# Patient Record
Sex: FEMALE | Race: WHITE | NOT HISPANIC OR LATINO | Employment: FULL TIME | URBAN - METROPOLITAN AREA
[De-identification: names, ages, dates, MRNs, and addresses within clinical notes are randomized per-mention and may not be internally consistent; named-entity substitution may affect disease eponyms.]

---
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
## 2023-03-01 DIAGNOSIS — Z30.42 ENCOUNTER FOR DEPO-PROVERA CONTRACEPTION: ICD-10-CM

## 2023-03-02 ENCOUNTER — CLINICAL SUPPORT (OUTPATIENT)
Dept: FAMILY MEDICINE CLINIC | Facility: CLINIC | Age: 18
End: 2023-03-02
Payer: COMMERCIAL

## 2023-03-02 DIAGNOSIS — Z30.8 ENCOUNTER FOR OTHER CONTRACEPTIVE MANAGEMENT: ICD-10-CM

## 2023-03-02 DIAGNOSIS — Z30.42 ENCOUNTER FOR DEPO-PROVERA CONTRACEPTION: Primary | ICD-10-CM

## 2023-03-02 PROCEDURE — 96372 THER/PROPH/DIAG INJ SC/IM: CPT | Performed by: PHYSICIAN ASSISTANT

## 2023-03-02 RX ORDER — MEDROXYPROGESTERONE ACETATE 150 MG/ML
150 INJECTION, SUSPENSION INTRAMUSCULAR ONCE
Status: COMPLETED | OUTPATIENT
Start: 2023-03-02 | End: 2023-03-02

## 2023-03-02 RX ORDER — MEDROXYPROGESTERONE ACETATE 150 MG/ML
INJECTION, SUSPENSION INTRAMUSCULAR
Qty: 1 ML | Refills: 0 | Status: SHIPPED | OUTPATIENT
Start: 2023-03-02

## 2023-03-02 RX ADMIN — MEDROXYPROGESTERONE ACETATE 150 MG: 150 INJECTION, SUSPENSION INTRAMUSCULAR at 16:22

## 2023-05-22 ENCOUNTER — TELEPHONE (OUTPATIENT)
Dept: FAMILY MEDICINE CLINIC | Facility: CLINIC | Age: 18
End: 2023-05-22

## 2023-05-22 NOTE — TELEPHONE ENCOUNTER
PT CALLED AND WOULD LIKE TO KNOW IF RX   medroxyPROGESTERone Acetate 150 MG/ML suspension prefilled syringe      HAS ALREADY BEEN SENT PHARMACY FOR HER TO  TO TOMORROW, PT STATED THAT SHE IS DUE FOR SHOT TOMORROW.    PLEASE ADVISE.cALL BACK:[phone]

## 2023-05-23 DIAGNOSIS — Z30.42 ENCOUNTER FOR DEPO-PROVERA CONTRACEPTION: ICD-10-CM

## 2023-05-23 RX ORDER — MEDROXYPROGESTERONE ACETATE 150 MG/ML
1 INJECTION, SUSPENSION INTRAMUSCULAR
Qty: 1 ML | Refills: 3 | Status: SHIPPED | OUTPATIENT
Start: 2023-05-23

## 2023-05-23 NOTE — TELEPHONE ENCOUNTER
Refill sent to pharmacy. I have not seen this patient since 2021. Needs to be seen by our office once per year for any ongoing prescriptions. No updated insurance on file.  Please have her schedule a follow up visit for annual

## 2023-05-30 ENCOUNTER — OFFICE VISIT (OUTPATIENT)
Dept: FAMILY MEDICINE CLINIC | Facility: CLINIC | Age: 18
End: 2023-05-30
Payer: COMMERCIAL

## 2023-05-30 VITALS
RESPIRATION RATE: 18 BRPM | DIASTOLIC BLOOD PRESSURE: 72 MMHG | TEMPERATURE: 98.2 F | HEART RATE: 80 BPM | SYSTOLIC BLOOD PRESSURE: 124 MMHG | HEIGHT: 66 IN | WEIGHT: 242.2 LBS | OXYGEN SATURATION: 100 % | BODY MASS INDEX: 38.92 KG/M2

## 2023-05-30 DIAGNOSIS — Z13.1 SCREENING FOR DIABETES MELLITUS: ICD-10-CM

## 2023-05-30 DIAGNOSIS — R63.2 BINGE EATING: ICD-10-CM

## 2023-05-30 DIAGNOSIS — Z00.00 ENCOUNTER FOR WELL ADULT EXAM WITHOUT ABNORMAL FINDINGS: Primary | ICD-10-CM

## 2023-05-30 DIAGNOSIS — Z13.220 ENCOUNTER FOR LIPID SCREENING FOR CARDIOVASCULAR DISEASE: ICD-10-CM

## 2023-05-30 DIAGNOSIS — Z11.59 NEED FOR HEPATITIS C SCREENING TEST: ICD-10-CM

## 2023-05-30 DIAGNOSIS — Z30.42 ENCOUNTER FOR SURVEILLANCE OF INJECTABLE CONTRACEPTIVE: ICD-10-CM

## 2023-05-30 DIAGNOSIS — R53.82 CHRONIC FATIGUE: ICD-10-CM

## 2023-05-30 DIAGNOSIS — E66.9 OBESITY (BMI 30-39.9): ICD-10-CM

## 2023-05-30 DIAGNOSIS — Z13.6 ENCOUNTER FOR LIPID SCREENING FOR CARDIOVASCULAR DISEASE: ICD-10-CM

## 2023-05-30 DIAGNOSIS — E55.9 VITAMIN D DEFICIENCY: ICD-10-CM

## 2023-05-30 RX ORDER — MEDROXYPROGESTERONE ACETATE 150 MG/ML
150 INJECTION, SUSPENSION INTRAMUSCULAR
COMMUNITY

## 2023-05-30 RX ORDER — BUPROPION HYDROCHLORIDE 150 MG/1
150 TABLET ORAL DAILY
Qty: 30 TABLET | Refills: 1 | Status: SHIPPED | OUTPATIENT
Start: 2023-05-30

## 2023-05-30 RX ORDER — MEDROXYPROGESTERONE ACETATE 150 MG/ML
150 INJECTION, SUSPENSION INTRAMUSCULAR ONCE
Status: COMPLETED | OUTPATIENT
Start: 2023-05-30 | End: 2023-05-30

## 2023-05-30 RX ADMIN — MEDROXYPROGESTERONE ACETATE 150 MG: 150 INJECTION, SUSPENSION INTRAMUSCULAR at 17:04

## 2023-05-30 NOTE — PROGRESS NOTES
"Chief Complaint   Patient presents with    Annual Exam     Physical-depo shot       Subjective   The patient is a 17 y.o. female who presents for annual exam      Nutrition: processed food at work. Been trying to eat more fruit and grilled meats.   Exercise:  walking a lot at work. Riding bike.   Sleep: sleep doing okay. Snores rarely. Does stay fatigue. Drinks Monster energy drink in morning (zero sugar)      Grinds teeth. Recently lost filling. Due to follow up with dentist soon  Has astigmatism in R eye. Has glasses. Don't wear all the time. Notes they give her headaches.       Past Gynecological History:     No LMP recorded. unknown due to depo shot      Menses: rare     Contraception: Medroxyprogesterone ( Depo Provera) 150 mg IM x 1 then q 12 weeks     Pap History:she has never had a PAP test     Date of last mammogram: she has never had a mammogram. No direct relatives with breast cancer   Cousin at age of 24 had ovarian cancer. Had hysterectomy. On maternal side.      Past Medical History,Medications, Allergies reviewed.     HPI  Concerned about weight   Body mass index is 39.09 kg/m².   Stays active with job   Harder to lose weight with depo shot. Denies gaining weight    Was on WW in the past in [employer]. Last about 20 lbs.  Tried again but not as successful    Does think she struggles with binge eating at times    Pt wishes to continue depo at this time     Has been on Wellbutrin in the past for depression. Unsure how she did on this. Notes she was only on a low dose.    Zoloft before for depression but no longer taking       Objective     /72   Pulse 80   Temp 98.2 °F (36.8 °C)   Resp 18   Ht 167.6 cm (66\")   Wt 110 kg (242 lb 3.2 oz)   SpO2 100%   BMI 39.09 kg/m²     Physical Exam  Vitals and nursing note reviewed.   Constitutional:       Appearance: She is well-developed.   HENT:      Head: Normocephalic and atraumatic.      Right Ear: External ear normal.      Left Ear: External ear " normal.      Nose: Nose normal.      Mouth/Throat:      Pharynx: No oropharyngeal exudate.   Eyes:      Conjunctiva/sclera: Conjunctivae normal.   Neck:      Thyroid: No thyromegaly.      Trachea: No tracheal deviation.   Cardiovascular:      Rate and Rhythm: Normal rate and regular rhythm.      Heart sounds: Normal heart sounds.   Pulmonary:      Effort: Pulmonary effort is normal. No respiratory distress.      Breath sounds: Normal breath sounds. No wheezing or rales.   Chest:      Chest wall: No tenderness.   Abdominal:      General: Bowel sounds are normal.      Palpations: Abdomen is soft.      Tenderness: There is no abdominal tenderness.   Musculoskeletal:      Cervical back: Normal range of motion and neck supple.   Lymphadenopathy:      Cervical: No cervical adenopathy.   Skin:     General: Skin is warm and dry.   Neurological:      Mental Status: She is alert and oriented to person, place, and time.   Psychiatric:         Mood and Affect: Mood normal.         Behavior: Behavior normal.         Thought Content: Thought content normal.         Judgment: Judgment normal.       Assessment & Plan     1. Encounter for well adult exam without abnormal findings  - CBC w AUTO Differential  - Comprehensive metabolic panel  - TSH  - T4, free  - Hemoglobin A1c  - Iron Profile  - Vitamin B12  - Folate  - Lipid Panel  - Vitamin D 25 hydroxy  - Hepatitis C antibody  - POCT pregnancy, urine    2. Obesity (BMI 30-39.9)  - CBC w AUTO Differential  - Comprehensive metabolic panel  - TSH  - T4, free  - Hemoglobin A1c  - Iron Profile  - Vitamin B12  - Folate  - Lipid Panel  - buPROPion XL (Wellbutrin XL) 150 MG 24 hr tablet; Take 1 tablet by mouth Daily.  Dispense: 30 tablet; Refill: 1    3. Encounter for lipid screening for cardiovascular disease  - Lipid Panel    4. Screening for diabetes mellitus  - Hemoglobin A1c    5. Chronic fatigue  - CBC w AUTO Differential  - Comprehensive metabolic panel  - TSH  - T4, free  - Iron  Profile  - Vitamin B12  - Folate    6. Vitamin D deficiency  - Vitamin D 25 hydroxy    7. Binge eating  - buPROPion XL (Wellbutrin XL) 150 MG 24 hr tablet; Take 1 tablet by mouth Daily.  Dispense: 30 tablet; Refill: 1    8. Need for hepatitis C screening test  - Hepatitis C antibody    9. Encounter for surveillance of injectable contraceptive  - POCT pregnancy, urine  - MedroxyPROGESTERone Acetate (DEPO-PROVERA) injection 150 mg    Labs as outlined in plan   UPT negative. Depo administered with patient's consent   Trial of wellbutrin to help with mood and binge eating.     Counseling was given to patient for the following topics: instructions for management, risk factor reductions, patient and family education, impressions, and risks and benefits of treatment options . Total time of the encounter was 20 minutes and 10 minutes was spent counseling.    ROBERT Armstrong

## 2023-06-13 LAB
25(OH)D3+25(OH)D2 SERPL-MCNC: 25.4 NG/ML (ref 30–100)
ALBUMIN SERPL-MCNC: 4.8 G/DL (ref 3.5–5.2)
ALBUMIN/GLOB SERPL: 2.1 G/DL
ALP SERPL-CCNC: 163 U/L (ref 39–117)
ALT SERPL-CCNC: 28 U/L (ref 1–33)
AST SERPL-CCNC: 19 U/L (ref 1–32)
BASOPHILS # BLD AUTO: 0.03 10*3/MM3 (ref 0–0.2)
BASOPHILS NFR BLD AUTO: 0.4 % (ref 0–1.5)
BILIRUB SERPL-MCNC: 0.8 MG/DL (ref 0–1.2)
BUN SERPL-MCNC: 12 MG/DL (ref 6–20)
BUN/CREAT SERPL: 18.5 (ref 7–25)
CALCIUM SERPL-MCNC: 10 MG/DL (ref 8.6–10.5)
CHLORIDE SERPL-SCNC: 105 MMOL/L (ref 98–107)
CHOLEST SERPL-MCNC: 148 MG/DL (ref 0–200)
CO2 SERPL-SCNC: 23.6 MMOL/L (ref 22–29)
CREAT SERPL-MCNC: 0.65 MG/DL (ref 0.57–1)
EGFRCR SERPLBLD CKD-EPI 2021: 130.3 ML/MIN/1.73
EOSINOPHIL # BLD AUTO: 0.13 10*3/MM3 (ref 0–0.4)
EOSINOPHIL NFR BLD AUTO: 1.9 % (ref 0.3–6.2)
ERYTHROCYTE [DISTWIDTH] IN BLOOD BY AUTOMATED COUNT: 12.7 % (ref 12.3–15.4)
FOLATE SERPL-MCNC: 6.94 NG/ML (ref 4.78–24.2)
GLOBULIN SER CALC-MCNC: 2.3 GM/DL
GLUCOSE SERPL-MCNC: 83 MG/DL (ref 65–99)
HBA1C MFR BLD: 5.1 % (ref 4.8–5.6)
HCT VFR BLD AUTO: 40.4 % (ref 34–46.6)
HCV IGG SERPL QL IA: NON REACTIVE
HDLC SERPL-MCNC: 42 MG/DL (ref 40–60)
HGB BLD-MCNC: 13.7 G/DL (ref 12–15.9)
IMM GRANULOCYTES # BLD AUTO: 0.02 10*3/MM3 (ref 0–0.05)
IMM GRANULOCYTES NFR BLD AUTO: 0.3 % (ref 0–0.5)
IRON SATN MFR SERPL: 44 % (ref 20–50)
IRON SERPL-MCNC: 217 MCG/DL (ref 37–145)
LDLC SERPL CALC-MCNC: 89 MG/DL (ref 0–100)
LYMPHOCYTES # BLD AUTO: 3.11 10*3/MM3 (ref 0.7–3.1)
LYMPHOCYTES NFR BLD AUTO: 45.9 % (ref 19.6–45.3)
MCH RBC QN AUTO: 30.4 PG (ref 26.6–33)
MCHC RBC AUTO-ENTMCNC: 33.9 G/DL (ref 31.5–35.7)
MCV RBC AUTO: 89.6 FL (ref 79–97)
MONOCYTES # BLD AUTO: 0.39 10*3/MM3 (ref 0.1–0.9)
MONOCYTES NFR BLD AUTO: 5.8 % (ref 5–12)
NEUTROPHILS # BLD AUTO: 3.09 10*3/MM3 (ref 1.7–7)
NEUTROPHILS NFR BLD AUTO: 45.7 % (ref 42.7–76)
NRBC BLD AUTO-RTO: 0.1 /100 WBC (ref 0–0.2)
PLATELET # BLD AUTO: 272 10*3/MM3 (ref 140–450)
POTASSIUM SERPL-SCNC: 4.7 MMOL/L (ref 3.5–5.2)
PROT SERPL-MCNC: 7.1 G/DL (ref 6–8.5)
RBC # BLD AUTO: 4.51 10*6/MM3 (ref 3.77–5.28)
SODIUM SERPL-SCNC: 139 MMOL/L (ref 136–145)
T4 FREE SERPL-MCNC: 1.18 NG/DL (ref 0.93–1.7)
TIBC SERPL-MCNC: 495 MCG/DL
TRIGL SERPL-MCNC: 88 MG/DL (ref 0–150)
TSH SERPL DL<=0.005 MIU/L-ACNC: 3.79 UIU/ML (ref 0.27–4.2)
UIBC SERPL-MCNC: 278 MCG/DL (ref 112–346)
VIT B12 SERPL-MCNC: 740 PG/ML (ref 211–946)
VLDLC SERPL CALC-MCNC: 17 MG/DL (ref 5–40)
WBC # BLD AUTO: 6.77 10*3/MM3 (ref 3.4–10.8)

## 2023-06-19 DIAGNOSIS — R79.0 ABNORMAL BLOOD LEVEL OF IRON: Primary | ICD-10-CM

## 2023-08-21 ENCOUNTER — TELEPHONE (OUTPATIENT)
Dept: FAMILY MEDICINE CLINIC | Facility: CLINIC | Age: 18
End: 2023-08-21
Payer: COMMERCIAL

## 2023-08-21 ENCOUNTER — CLINICAL SUPPORT (OUTPATIENT)
Dept: FAMILY MEDICINE CLINIC | Facility: CLINIC | Age: 18
End: 2023-08-21
Payer: COMMERCIAL

## 2023-08-21 DIAGNOSIS — E66.9 OBESITY (BMI 30-39.9): ICD-10-CM

## 2023-08-21 DIAGNOSIS — R63.2 BINGE EATING: ICD-10-CM

## 2023-08-21 DIAGNOSIS — Z30.9 ENCOUNTER FOR CONTRACEPTIVE MANAGEMENT, UNSPECIFIED TYPE: Primary | ICD-10-CM

## 2023-08-21 PROCEDURE — 96372 THER/PROPH/DIAG INJ SC/IM: CPT | Performed by: PHYSICIAN ASSISTANT

## 2023-08-21 RX ORDER — MEDROXYPROGESTERONE ACETATE 150 MG/ML
150 INJECTION, SUSPENSION INTRAMUSCULAR
Status: CANCELLED | OUTPATIENT
Start: 2023-08-21

## 2023-08-21 RX ORDER — BUPROPION HYDROCHLORIDE 150 MG/1
150 TABLET ORAL DAILY
Qty: 30 TABLET | Refills: 1 | Status: SHIPPED | OUTPATIENT
Start: 2023-08-21

## 2023-08-21 RX ORDER — MEDROXYPROGESTERONE ACETATE 150 MG/ML
150 INJECTION, SUSPENSION INTRAMUSCULAR ONCE
Status: COMPLETED | OUTPATIENT
Start: 2023-08-21 | End: 2023-08-21

## 2023-08-21 RX ADMIN — MEDROXYPROGESTERONE ACETATE 150 MG: 150 INJECTION, SUSPENSION INTRAMUSCULAR at 15:35

## 2023-08-21 NOTE — TELEPHONE ENCOUNTER
Caller: Zaynab Starks    Relationship: Self    Best call back number: [phone]     Requested Prescriptions:   Requested Prescriptions     Pending Prescriptions Disp Refills    buPROPion XL (Wellbutrin XL) 150 MG 24 hr tablet 30 tablet 1     Sig: Take 1 tablet by mouth Daily.        Pharmacy where request should be sent: [facility] [address] [place] [address] [phone] [facility] [phone] FX     Last office visit with prescribing clinician: 5/30/2023   Last telemedicine visit with prescribing clinician: Visit date not found   Next office visit with prescribing clinician: Visit date not found     Does the patient have less than a 3 day supply:  [x] Yes  [] No    Would you like a call back once the refill request has been completed: [] Yes [x] No    If the office needs to give you a call back, can they leave a voicemail: [] Yes [x] No    Dedrick Mckeon Rep   08/21/23 10:21 EDT

## 2023-08-21 NOTE — TELEPHONE ENCOUNTER
Pt called stating she needed to get her depo shot today because she can only get it done on Mondays and next Monday would be two day past.      [phone]

## 2023-09-15 ENCOUNTER — TELEPHONE (OUTPATIENT)
Dept: FAMILY MEDICINE CLINIC | Facility: CLINIC | Age: 18
End: 2023-09-15
Payer: COMMERCIAL

## 2023-11-13 ENCOUNTER — CLINICAL SUPPORT (OUTPATIENT)
Dept: FAMILY MEDICINE CLINIC | Facility: CLINIC | Age: 18
End: 2023-11-13
Payer: COMMERCIAL

## 2023-11-13 DIAGNOSIS — Z30.42 ENCOUNTER FOR DEPO-PROVERA CONTRACEPTION: Primary | ICD-10-CM

## 2023-11-13 RX ORDER — MEDROXYPROGESTERONE ACETATE 150 MG/ML
150 INJECTION, SUSPENSION INTRAMUSCULAR ONCE
Status: COMPLETED | OUTPATIENT
Start: 2023-11-13 | End: 2023-11-13

## 2023-11-13 RX ADMIN — MEDROXYPROGESTERONE ACETATE 150 MG: 150 INJECTION, SUSPENSION INTRAMUSCULAR at 14:31

## 2024-02-12 ENCOUNTER — CLINICAL SUPPORT (OUTPATIENT)
Dept: FAMILY MEDICINE CLINIC | Facility: CLINIC | Age: 19
End: 2024-02-12
Payer: COMMERCIAL

## 2024-02-12 DIAGNOSIS — Z30.42 ENCOUNTER FOR DEPO-PROVERA CONTRACEPTION: Primary | ICD-10-CM

## 2024-02-12 PROCEDURE — 96372 THER/PROPH/DIAG INJ SC/IM: CPT | Performed by: PHYSICIAN ASSISTANT

## 2024-02-12 RX ORDER — MEDROXYPROGESTERONE ACETATE 150 MG/ML
150 INJECTION, SUSPENSION INTRAMUSCULAR ONCE
Status: COMPLETED | OUTPATIENT
Start: 2024-02-12 | End: 2024-02-12

## 2024-02-12 RX ADMIN — MEDROXYPROGESTERONE ACETATE 150 MG: 150 INJECTION, SUSPENSION INTRAMUSCULAR at 11:45

## 2024-05-13 ENCOUNTER — TELEPHONE (OUTPATIENT)
Dept: FAMILY MEDICINE CLINIC | Facility: CLINIC | Age: 19
End: 2024-05-13
Payer: COMMERCIAL

## 2024-05-13 ENCOUNTER — CLINICAL SUPPORT (OUTPATIENT)
Dept: FAMILY MEDICINE CLINIC | Facility: CLINIC | Age: 19
End: 2024-05-13
Payer: COMMERCIAL

## 2024-05-13 DIAGNOSIS — Z30.42 ENCOUNTER FOR DEPO-PROVERA CONTRACEPTION: Primary | ICD-10-CM

## 2024-05-13 PROCEDURE — 96372 THER/PROPH/DIAG INJ SC/IM: CPT | Performed by: PHYSICIAN ASSISTANT

## 2024-05-13 RX ORDER — MEDROXYPROGESTERONE ACETATE 150 MG/ML
INJECTION, SUSPENSION INTRAMUSCULAR
Qty: 1 ML | Refills: 0 | OUTPATIENT
Start: 2024-05-13

## 2024-05-13 RX ORDER — MEDROXYPROGESTERONE ACETATE 150 MG/ML
150 INJECTION, SUSPENSION INTRAMUSCULAR
Qty: 1 ML | Refills: 3 | Status: SHIPPED | OUTPATIENT
Start: 2024-05-13

## 2024-05-13 RX ORDER — MEDROXYPROGESTERONE ACETATE 150 MG/ML
150 INJECTION, SUSPENSION INTRAMUSCULAR ONCE
Status: COMPLETED | OUTPATIENT
Start: 2024-05-13 | End: 2024-05-13

## 2024-05-13 RX ADMIN — MEDROXYPROGESTERONE ACETATE 150 MG: 150 INJECTION, SUSPENSION INTRAMUSCULAR at 17:42

## 2024-05-13 NOTE — TELEPHONE ENCOUNTER
REFILL WAS DENIED DUE TO NOT PAP SMEAR ON FILE. PATIENT CALLED STATING THEY ARE NOT ELIGIBLE FOR PAP SMEAR DUE TO BEING UNDER 19 YEARS OLD. PLEASE ADVISE

## 2024-05-13 NOTE — TELEPHONE ENCOUNTER
Refill sent to pharmacy. Make sure if late on injection there is no chance of pregnancy before next injection. Needs to schedule annual and will discuss getting her established with GYN after she turns 19

## 2024-05-13 NOTE — TELEPHONE ENCOUNTER
Caller: Zaynab Starks    Relationship: Self    Best call back number: [phone]    Requested Prescriptions:   Requested Prescriptions     Pending Prescriptions Disp Refills    medroxyPROGESTERone Acetate 150 MG/ML suspension prefilled syringe [Pharmacy Med Name: medroxyPROGESTERone Acetate 150 MG/ML Intramuscular Suspension Prefilled Syringe] 1 mL 0     Sig: INJECT 1 ML INTRAMUSCULARLY EVERY 3 MONTHS        Pharmacy where request should be sent: [address] [phone] [facility] [phone]      Last office visit with prescribing clinician: 5/30/2023   Last telemedicine visit with prescribing clinician: Visit date not found   Next office visit with prescribing clinician: Visit date not found     Additional details provided by patient: PATIENT STATES THAT IF SHE NEEDS TO BE SEEN PLEASE LET HER KNOW    Does the patient have less than a 3 day supply:  [x] Yes  [] No    Would you like a call back once the refill request has been completed: [] Yes [x] No    If the office needs to give you a call back, can they leave a voicemail: [] Yes [x] No    Dedrick Bartholomew Rep   05/13/24 10:07 EDT

## 2024-06-10 ENCOUNTER — OFFICE VISIT (OUTPATIENT)
Dept: FAMILY MEDICINE CLINIC | Facility: CLINIC | Age: 19
End: 2024-06-10
Payer: COMMERCIAL

## 2024-06-10 VITALS
WEIGHT: 254 LBS | HEIGHT: 66 IN | BODY MASS INDEX: 40.82 KG/M2 | SYSTOLIC BLOOD PRESSURE: 132 MMHG | OXYGEN SATURATION: 99 % | DIASTOLIC BLOOD PRESSURE: 72 MMHG | HEART RATE: 70 BPM | TEMPERATURE: 98.4 F

## 2024-06-10 DIAGNOSIS — Z00.00 ENCOUNTER FOR WELL ADULT EXAM WITHOUT ABNORMAL FINDINGS: Primary | ICD-10-CM

## 2024-06-10 DIAGNOSIS — R63.2 BINGE EATING: ICD-10-CM

## 2024-06-10 DIAGNOSIS — E66.01 MORBID OBESITY: ICD-10-CM

## 2024-06-10 DIAGNOSIS — Z13.6 ENCOUNTER FOR LIPID SCREENING FOR CARDIOVASCULAR DISEASE: ICD-10-CM

## 2024-06-10 DIAGNOSIS — Z13.220 ENCOUNTER FOR LIPID SCREENING FOR CARDIOVASCULAR DISEASE: ICD-10-CM

## 2024-06-10 DIAGNOSIS — E55.9 VITAMIN D DEFICIENCY: ICD-10-CM

## 2024-06-10 PROCEDURE — 99395 PREV VISIT EST AGE 18-39: CPT | Performed by: PHYSICIAN ASSISTANT

## 2024-06-10 RX ORDER — BUPROPION HYDROCHLORIDE 150 MG/1
150 TABLET ORAL DAILY
Qty: 90 TABLET | Refills: 3 | Status: SHIPPED | OUTPATIENT
Start: 2024-06-10

## 2024-06-10 NOTE — PROGRESS NOTES
"Chief Complaint   Patient presents with    Annual Exam     Med refill on Wellbutrin       Subjective   The patient is a 18 y.o. female who presents for annual physical      Nutrition:  increasing protein and vegetables.  cutting back on energy drinks. 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 Does wear prescription readers when needed.       Past Gynecological History:     No LMP recorded.     Menses: irregular     Contraception: Medroxyprogesterone ( Depo Provera) 150 mg IM x 1 then q 12 weeks     Sexually transmitted disease history: none     Pap History:she has never had a PAP test       Past Medical History,Medications, Allergies reviewed.     HPI  Pt presents for annual exam   Continues with depo provera. Okay on refills. Will place referral to GYN to establish care and for first pap after 19st birthday later this fall     Notes she took Wellbutrin for awhile for BED and felt like it helped. Would like to resume. Needs refills.       Objective     /72   Pulse 70   Temp 98.4 °F (36.9 °C)   Ht 167.6 cm (66\")   Wt 115 kg (254 lb)   SpO2 99%   BMI 41.00 kg/m²     Physical Exam  Vitals and nursing note reviewed.   Constitutional:       Appearance: Normal appearance. She is well-developed.   HENT:      Head: Normocephalic and atraumatic.      Right Ear: Tympanic membrane, ear canal and external ear normal.      Left Ear: Tympanic membrane, ear canal and external ear normal.      Nose: Nose normal.      Mouth/Throat:      Pharynx: No oropharyngeal exudate or posterior oropharyngeal erythema.   Eyes:      Conjunctiva/sclera: Conjunctivae normal.   Neck:      Thyroid: No thyromegaly.      Trachea: No tracheal deviation.   Cardiovascular:      Rate and Rhythm: Normal rate and regular rhythm.      Heart sounds: Normal heart sounds.   Pulmonary:      Effort: Pulmonary effort is normal. No respiratory " distress.      Breath sounds: Normal breath sounds. No wheezing or rales.   Chest:      Chest wall: No tenderness.   Abdominal:      General: Bowel sounds are normal. There is no distension.      Palpations: Abdomen is soft. There is no mass.      Tenderness: There is no abdominal tenderness. There is no guarding or rebound.      Hernia: No hernia is present.   Musculoskeletal:      Cervical back: Normal range of motion and neck supple.   Lymphadenopathy:      Cervical: No cervical adenopathy.   Skin:     General: Skin is warm and dry.   Neurological:      Mental Status: She is alert and oriented to person, place, and time.   Psychiatric:         Mood and Affect: Mood normal.         Behavior: Behavior normal.         Thought Content: Thought content normal.         Judgment: Judgment normal.         Assessment & Plan     1. Encounter for well adult exam without abnormal findings  - Ambulatory Referral to Gynecology  - CBC w AUTO Differential  - Comprehensive metabolic panel  - Lipid Panel  - Hemoglobin A1c  - TSH  - T4, free  - Iron Profile  - Vitamin B12  - Folate  - Vitamin D 25 hydroxy    2. Encounter for lipid screening for cardiovascular disease  - Lipid Panel    3. Vitamin D deficiency  - Vitamin D 25 hydroxy    4. Morbid obesity  - CBC w AUTO Differential  - Comprehensive metabolic panel  - Lipid Panel  - Hemoglobin A1c  - TSH  - T4, free  - Iron Profile  - Vitamin B12  - Folate  - Vitamin D 25 hydroxy    5. Binge eating  - buPROPion XL (Wellbutrin XL) 150 MG 24 hr tablet; Take 1 tablet by mouth Daily.  Dispense: 90 tablet; Refill: 3    Labs as outlined in plan   Referral to GYN placed   Continue with wellbutrin   F/u in 3 months to check on BED symptoms and weight management     Counseling was given to patient for the following topics: instructions for management, risk factor reductions, patient and family education, and impressions . Total time of the encounter was 10 minutes and 5 minutes was spent  counseling.      ROBERT Armstrong

## 2024-06-11 LAB
25(OH)D3+25(OH)D2 SERPL-MCNC: 22.7 NG/ML (ref 30–100)
ALBUMIN SERPL-MCNC: 4.7 G/DL (ref 4–5)
ALBUMIN/GLOB SERPL: 2.2 {RATIO}
ALP SERPL-CCNC: 101 IU/L (ref 42–106)
ALT SERPL-CCNC: 20 IU/L (ref 0–32)
AST SERPL-CCNC: 18 IU/L (ref 0–40)
BASOPHILS # BLD AUTO: 0 X10E3/UL (ref 0–0.2)
BASOPHILS NFR BLD AUTO: 0 %
BILIRUB SERPL-MCNC: 0.8 MG/DL (ref 0–1.2)
BUN SERPL-MCNC: 11 MG/DL (ref 6–20)
BUN/CREAT SERPL: 16 (ref 9–23)
CALCIUM SERPL-MCNC: 9.2 MG/DL (ref 8.7–10.2)
CHLORIDE SERPL-SCNC: 107 MMOL/L (ref 96–106)
CHOLEST SERPL-MCNC: 144 MG/DL (ref 100–199)
CO2 SERPL-SCNC: 19 MMOL/L (ref 20–29)
CREAT SERPL-MCNC: 0.69 MG/DL (ref 0.57–1)
EGFRCR SERPLBLD CKD-EPI 2021: 127 ML/MIN/1.73
EOSINOPHIL # BLD AUTO: 0.1 X10E3/UL (ref 0–0.4)
EOSINOPHIL NFR BLD AUTO: 1 %
ERYTHROCYTE [DISTWIDTH] IN BLOOD BY AUTOMATED COUNT: 11.7 % (ref 11.7–15.4)
FOLATE SERPL-MCNC: 7.2 NG/ML
GLOBULIN SER CALC-MCNC: 2.1 G/DL (ref 1.5–4.5)
GLUCOSE SERPL-MCNC: 95 MG/DL (ref 70–99)
HBA1C MFR BLD: 5.4 % (ref 4.8–5.6)
HCT VFR BLD AUTO: 40.6 % (ref 34–46.6)
HDLC SERPL-MCNC: 40 MG/DL
HGB BLD-MCNC: 13.4 G/DL (ref 11.1–15.9)
IMM GRANULOCYTES # BLD AUTO: 0 X10E3/UL (ref 0–0.1)
IMM GRANULOCYTES NFR BLD AUTO: 0 %
IRON SATN MFR SERPL: 41 % (ref 15–55)
IRON SERPL-MCNC: 158 UG/DL (ref 27–159)
LDL CALC COMMENT:: NORMAL
LDLC SERPL CALC-MCNC: 91 MG/DL (ref 0–99)
LYMPHOCYTES # BLD AUTO: 2.6 X10E3/UL (ref 0.7–3.1)
LYMPHOCYTES NFR BLD AUTO: 37 %
MCH RBC QN AUTO: 31.4 PG (ref 26.6–33)
MCHC RBC AUTO-ENTMCNC: 33 G/DL (ref 31.5–35.7)
MCV RBC AUTO: 95 FL (ref 79–97)
MONOCYTES # BLD AUTO: 0.4 X10E3/UL (ref 0.1–0.9)
MONOCYTES NFR BLD AUTO: 6 %
NEUTROPHILS # BLD AUTO: 3.8 X10E3/UL (ref 1.4–7)
NEUTROPHILS NFR BLD AUTO: 56 %
PLATELET # BLD AUTO: 256 X10E3/UL (ref 150–450)
POTASSIUM SERPL-SCNC: 4.4 MMOL/L (ref 3.5–5.2)
PROT SERPL-MCNC: 6.8 G/DL (ref 6–8.5)
RBC # BLD AUTO: 4.27 X10E6/UL (ref 3.77–5.28)
SODIUM SERPL-SCNC: 141 MMOL/L (ref 134–144)
T4 FREE SERPL-MCNC: 1.3 NG/DL (ref 0.82–1.77)
TIBC SERPL-MCNC: 384 UG/DL (ref 250–450)
TRIGL SERPL-MCNC: 65 MG/DL (ref 0–149)
TSH SERPL DL<=0.005 MIU/L-ACNC: 3.47 UIU/ML (ref 0.45–4.5)
UIBC SERPL-MCNC: 226 UG/DL (ref 131–425)
VIT B12 SERPL-MCNC: 567 PG/ML (ref 232–1245)
VLDLC SERPL CALC-MCNC: 13 MG/DL (ref 5–40)
WBC # BLD AUTO: 6.8 X10E3/UL (ref 3.4–10.8)

## 2024-07-21 ENCOUNTER — PATIENT MESSAGE (OUTPATIENT)
Dept: FAMILY MEDICINE CLINIC | Facility: CLINIC | Age: 19
End: 2024-07-21
Payer: COMMERCIAL

## 2024-07-22 NOTE — TELEPHONE ENCOUNTER
From: Zaynab Starks  To: Xavier Sun  Sent: 7/21/2024 5:54 PM EDT  Subject: Depo Shot    Hi Xavier I have contacted the gynecologist and have an appointment but it will not be until November so will I be able to get my next depo shot dose at [facility] or will i have to contact the gynecologist office.

## 2024-07-29 ENCOUNTER — CLINICAL SUPPORT (OUTPATIENT)
Dept: FAMILY MEDICINE CLINIC | Facility: CLINIC | Age: 19
End: 2024-07-29
Payer: COMMERCIAL

## 2024-07-29 ENCOUNTER — TELEPHONE (OUTPATIENT)
Dept: FAMILY MEDICINE CLINIC | Facility: CLINIC | Age: 19
End: 2024-07-29
Payer: COMMERCIAL

## 2024-07-29 DIAGNOSIS — Z30.42 ENCOUNTER FOR DEPO-PROVERA CONTRACEPTION: Primary | ICD-10-CM

## 2024-07-29 PROCEDURE — 96372 THER/PROPH/DIAG INJ SC/IM: CPT | Performed by: PHYSICIAN ASSISTANT

## 2024-07-29 RX ORDER — MEDROXYPROGESTERONE ACETATE 150 MG/ML
150 INJECTION, SUSPENSION INTRAMUSCULAR ONCE
Status: COMPLETED | OUTPATIENT
Start: 2024-07-29 | End: 2024-07-29

## 2024-07-29 RX ADMIN — MEDROXYPROGESTERONE ACETATE 150 MG: 150 INJECTION, SUSPENSION INTRAMUSCULAR at 12:42

## 2024-07-29 NOTE — TELEPHONE ENCOUNTER
A user error has taken place: encounter opened in error, closed for administrative reasons.     breath sounds clear and equal bilaterally.

## 2024-07-29 NOTE — TELEPHONE ENCOUNTER
PATIENT HAS CALLED AND STATED PHARMACY DOES NOT HAVE HER PRESCRIPTION FOR medroxyPROGESTERone (DEPO-PROVERA) 150 MG/ML injection. PATIENT IS REQUESTING IF A NEW PRESCRIPTION CAN BE SENT TO [facility] IN [place] ON [address]. PATIENT IS REQUESTING A CALL BACK WITH STATUS OF REFILL REQUEST.     CALL BACK NUMBER [phone]

## 2024-08-15 ENCOUNTER — TELEPHONE (OUTPATIENT)
Dept: FAMILY MEDICINE CLINIC | Facility: CLINIC | Age: 19
End: 2024-08-15
Payer: COMMERCIAL

## 2024-08-15 NOTE — TELEPHONE ENCOUNTER
PATIENT RETURNED A MISS CALL, DIDN'T SEE AN ENCOUNTER ON WHO THE CALL WAS FROM. SHE'S REQUESTED THAT IF SOMEONE RECALLS TO CALL AFTER 3:30 AS SHE IS CURRENTLY AT WORK UNTIL THEN          PLEASE ADVISE

## 2024-10-21 ENCOUNTER — CLINICAL SUPPORT (OUTPATIENT)
Dept: FAMILY MEDICINE CLINIC | Facility: CLINIC | Age: 19
End: 2024-10-21
Payer: COMMERCIAL

## 2024-10-21 DIAGNOSIS — Z30.42 ENCOUNTER FOR DEPO-PROVERA CONTRACEPTION: Primary | ICD-10-CM

## 2024-10-21 PROCEDURE — 96372 THER/PROPH/DIAG INJ SC/IM: CPT | Performed by: PHYSICIAN ASSISTANT

## 2024-10-21 RX ORDER — MEDROXYPROGESTERONE ACETATE 150 MG/ML
150 INJECTION, SUSPENSION INTRAMUSCULAR ONCE
Status: COMPLETED | OUTPATIENT
Start: 2024-10-21 | End: 2024-10-21

## 2024-10-21 RX ADMIN — MEDROXYPROGESTERONE ACETATE 150 MG: 150 INJECTION, SUSPENSION INTRAMUSCULAR at 11:46

## 2024-11-04 ENCOUNTER — OFFICE VISIT (OUTPATIENT)
Dept: OBSTETRICS AND GYNECOLOGY | Facility: CLINIC | Age: 19
End: 2024-11-04
Payer: COMMERCIAL

## 2024-11-04 VITALS
DIASTOLIC BLOOD PRESSURE: 76 MMHG | WEIGHT: 254 LBS | BODY MASS INDEX: 40.82 KG/M2 | SYSTOLIC BLOOD PRESSURE: 118 MMHG | HEIGHT: 66 IN

## 2024-11-04 DIAGNOSIS — Z01.419 WOMEN'S ANNUAL ROUTINE GYNECOLOGICAL EXAMINATION: Primary | ICD-10-CM

## 2024-11-04 DIAGNOSIS — Z30.09 GENERAL COUNSELING FOR INITIATION OF OTHER CONTRACEPTIVE MEASURES: ICD-10-CM

## 2024-11-04 DIAGNOSIS — Z11.3 SCREENING EXAMINATION FOR STD (SEXUALLY TRANSMITTED DISEASE): ICD-10-CM

## 2024-11-04 PROCEDURE — 99385 PREV VISIT NEW AGE 18-39: CPT | Performed by: OBSTETRICS & GYNECOLOGY

## 2024-11-04 PROCEDURE — 99459 PELVIC EXAMINATION: CPT | Performed by: OBSTETRICS & GYNECOLOGY

## 2024-11-04 NOTE — PROGRESS NOTES
Gynecologic Annual Exam Note        CC- Here for annual exam.     Subjective     HPI  Zaynab Starks is a 19 y.o. G0 female new patient who presents for annual well woman exam. No LMP recorded (lmp unknown). Patient has had an injection. Her periods are  absent secondary to Depo injections .      Partner Status: Marital Status: single.  New Partners since last visit: YES/NO/Other: no.  Condom usage with IC: never. Has had same partner for 7 years.      She has been on Depo for approx 3-4 years. Last injection was 10/24/24.  She was told after 5 years she should switch to another form of contraception. She started on Depo for her heavy periods. She would like to discuss other options after Depo. Prior to Depo had AUB and severely heavy periods, extended period of 3 months on OCPs.    She exercises regularly: yes. Physical job.   She has concerns about domestic violence: no.    OB History          0    Para   0    Term   0       0    AB   0    Living   0         SAB   0    IAB   0    Ectopic   0    Molar   0    Multiple   0    Live Births   0                Current contraception: Depo-Provera injections  History of abnormal Pap smear:  N/A- first pap smear today   Family history of uterine, colon or ovarian cancer: yes - colon cancer- maternal grandfather , cervical cancer   Family history of breast cancer: no  H/o STDs: none   Last pap:never, first one today   Gardasil:completed    Last Pap : never  Last Completed Pap Smear       This patient has no relevant Health Maintenance data.            Health Maintenance   Topic Date Due    Annual Gynecologic Pelvic and Breast Exam  Never done    CHLAMYDIA SCREENING  Never done    PAP SMEAR  Never done    INFLUENZA VACCINE  2024    COVID-19 Vaccine ( - - season) Never done    TDAP/TD VACCINES (2 - Td or Tdap) 2024    Pneumococcal Vaccine 0-64 (1 of 2 - PCV) 06/10/2025 (Originally 2009)    ANNUAL PHYSICAL  06/10/2025    BMI FOLLOWUP  " 06/10/2025    HEPATITIS C SCREENING  Completed    MENINGOCOCCAL VACCINE  Completed    HPV VACCINES  Completed       The following portions of the patient's history were reviewed and updated as appropriate: allergies, current medications, past family history, past medical history, past social history, and past surgical history.    Review of Systems  Review of Systems   Constitutional: Negative.    HENT: Negative.     Eyes: Negative.    Respiratory: Negative.     Cardiovascular: Negative.    Gastrointestinal: Negative.    Endocrine: Negative.    Genitourinary: Negative.    Musculoskeletal: Negative.    Skin: Negative.    Allergic/Immunologic: Negative.    Neurological: Negative.    Hematological: Negative.    Psychiatric/Behavioral: Negative.         I have reviewed and agree with the HPI, ROS, and historical information as entered above. Francoise Bowden MD      Past Medical History:   Diagnosis Date    Asthma N/A    Migraine without aura         Past Surgical History:   Procedure Laterality Date    ANKLE SURGERY Left 2021    WISDOM TOOTH EXTRACTION  11/17/2020          Objective   /76   Ht 167.6 cm (66\")   Wt 115 kg (254 lb)   LMP  (LMP Unknown)   BMI 41.00 kg/m²     Physical Exam  Vitals and nursing note reviewed. Exam conducted with a chaperone present.   Constitutional:       General: She is awake.   HENT:      Head: Normocephalic and atraumatic.   Neck:      Thyroid: No thyroid mass, thyromegaly or thyroid tenderness.   Cardiovascular:      Rate and Rhythm: Normal rate.      Heart sounds: No murmur heard.     No friction rub. No gallop.   Pulmonary:      Effort: Pulmonary effort is normal.      Breath sounds: Normal breath sounds. No stridor. No wheezing, rhonchi or rales.   Chest:      Chest wall: No mass or tenderness.   Breasts:     Right: Normal. No bleeding, inverted nipple, mass, nipple discharge, skin change or tenderness.      Left: Normal. No bleeding, inverted nipple, mass, nipple discharge, " skin change or tenderness.   Abdominal:      Palpations: Abdomen is soft.      Tenderness: There is no guarding or rebound.      Hernia: There is no hernia in the left inguinal area or right inguinal area.   Genitourinary:     General: Normal vulva.      Pubic Area: No rash.       Labia:         Right: No rash, tenderness, lesion or injury.         Left: No rash, tenderness, lesion or injury.       Urethra: No prolapse, urethral swelling or urethral lesion.      Vagina: No foreign body. No vaginal discharge, erythema, tenderness, bleeding or lesions.      Cervix: No cervical motion tenderness, discharge, friability, lesion, erythema or cervical bleeding.      Uterus: Normal. Not deviated, not enlarged, not fixed and not tender.       Adnexa: Right adnexa normal and left adnexa normal.        Right: No mass, tenderness or fullness.          Left: No mass, tenderness or fullness.        Rectum: No external hemorrhoid.          Comments: Areas of scattered pigmented lesions noted at introitus at 7:00, there is a rectangular pigmented lesion that is somewhat more dense noted at 5:00.    Musculoskeletal:      Cervical back: Normal range of motion.   Lymphadenopathy:      Upper Body:      Right upper body: No supraclavicular or axillary adenopathy.      Left upper body: No supraclavicular or axillary adenopathy.   Skin:     General: Skin is warm.   Neurological:      Mental Status: She is alert and oriented to person, place, and time.   Psychiatric:         Mood and Affect: Mood and affect normal.         Speech: Speech normal.          Assessment   Assessment  Problem List Items Addressed This Visit    None  Visit Diagnoses       Women's annual routine gynecological examination    -  Primary    Relevant Orders    LIQUID-BASED PAP SMEAR WITH HPV GENOTYPING IF ASCUS ([facility])    Screening examination for STD (sexually transmitted disease)        Relevant Orders    LIQUID-BASED PAP SMEAR WITH HPV GENOTYPING IF ASCUS  ([facility])    General counseling for initiation of other contraceptive measures                  Assessment     Problem List Items Addressed This Visit    None  Visit Diagnoses       Women's annual routine gynecological examination    -  Primary    Relevant Orders    LIQUID-BASED PAP SMEAR WITH HPV GENOTYPING IF ASCUS ([facility])    Screening examination for STD (sexually transmitted disease)        Relevant Orders    LIQUID-BASED PAP SMEAR WITH HPV GENOTYPING IF ASCUS ([facility])    General counseling for initiation of other contraceptive measures                  Plan     Reviewed monthly self breast exams.  Instructed to call with lumps, pain, or breast discharge.    Reviewed BMI and weight loss as preventative health measures.   Reviewed exercise as a preventative health measures.   RTC in 1 year or PRN with problems  The patient was counseled on types of intrauterine devices, insertion, and the typical bleeding patterns of these.  She understands that she may have spotting for 2-3 months after insertion and then may or may not have periods.  Occasionally, people may have more prolonged spotting.  Most people will have significantly shorter lighter periods with Kyleena or Mirena IUDs.  With Paraguard IUDs, periods may be heavier and cramping may become more intense.   The patient was counseled on risks of OCPs including increased risks for thromboembolic disease including dvt, pulmonary embolus, stroke and death.  We reviewed theoretical risks for breast cancer.  We also discussed risks for hypertension.      Pt due for Depo in January, may continue this or switch to different method.  She will consider options and let us know in January.    Return for January for contraception followup , Recheck.  Recheck of pigmented vulvar lesions.         Francoise Bowden MD  11/04/2024

## 2024-11-05 LAB — REF LAB TEST METHOD: NORMAL

## 2024-11-08 ENCOUNTER — PATIENT ROUNDING (BHMG ONLY) (OUTPATIENT)
Dept: OBSTETRICS AND GYNECOLOGY | Facility: CLINIC | Age: 19
End: 2024-11-08
Payer: COMMERCIAL

## 2024-11-08 NOTE — PROGRESS NOTES
November 8, 2024    Hello, may I speak with Zaynab Starks?    My name is Ivis       I am  with [facility]  [facility]  [address]  [place] [address] [phone].    Before we get started may I verify your date of birth? 2005    I am calling to officially welcome you to our practice and ask about your recent visit. Is this a good time to talk? No, left voicemail     Tell me about your visit with us. What things went well?  na       We're always looking for ways to make our patients' experiences even better. Do you have recommendations on ways we may improve?  na    Overall were you satisfied with your first visit to our practice? Na        I appreciate you taking the time to speak with me today. Is there anything else I can do for you? na      Thank you, and have a great day.

## 2025-01-08 ENCOUNTER — CLINICAL SUPPORT (OUTPATIENT)
Dept: FAMILY MEDICINE CLINIC | Facility: CLINIC | Age: 20
End: 2025-01-08
Payer: COMMERCIAL

## 2025-01-08 DIAGNOSIS — Z30.42 ENCOUNTER FOR DEPO-PROVERA CONTRACEPTION: ICD-10-CM

## 2025-01-08 DIAGNOSIS — Z30.9 ENCOUNTER FOR CONTRACEPTIVE MANAGEMENT, UNSPECIFIED TYPE: Primary | ICD-10-CM

## 2025-01-08 PROCEDURE — 96372 THER/PROPH/DIAG INJ SC/IM: CPT | Performed by: PHYSICIAN ASSISTANT

## 2025-01-08 RX ORDER — MEDROXYPROGESTERONE ACETATE 150 MG/ML
1.5 INJECTION, SUSPENSION INTRAMUSCULAR ONCE
Status: COMPLETED | OUTPATIENT
Start: 2025-01-08 | End: 2025-01-08

## 2025-01-08 RX ORDER — MEDROXYPROGESTERONE ACETATE 150 MG/ML
150 INJECTION, SUSPENSION INTRAMUSCULAR
Qty: 1 ML | Refills: 3 | Status: SHIPPED | OUTPATIENT
Start: 2025-01-08

## 2025-01-08 RX ADMIN — MEDROXYPROGESTERONE ACETATE 1.5 MG: 150 INJECTION, SUSPENSION INTRAMUSCULAR at 15:48

## 2025-01-08 NOTE — TELEPHONE ENCOUNTER
PT CALLED BACK REGARDING TE BELOW FOR DEPO INJ REFILL, PT HAD HER ANNUAL EXAM ON 11-4-24, PT IS REQ A REFILL AND A CALL BACK WHEN SENT AT EARLIEST CONVENIENCE      medroxyPROGESTERone (DEPO-PROVERA) 150 MG/ML injection           [facility] [address] [place] [address] - [address] [facility] [phone] [facility] [phone]    [address] [facility] [address]   Phone: [phone] Fax: [phone]   Hours: Not open 24 hours

## 2025-04-07 ENCOUNTER — TELEPHONE (OUTPATIENT)
Dept: OBSTETRICS AND GYNECOLOGY | Facility: CLINIC | Age: 20
End: 2025-04-07
Payer: COMMERCIAL

## 2025-04-07 NOTE — TELEPHONE ENCOUNTER
Returned patient call- scheduled for 4/21/2025 with Carolyn- patient planning to get depo injection again prior to kyleena due to expiration.

## 2025-04-07 NOTE — TELEPHONE ENCOUNTER
Provider: DR. GROSS    Caller: Zaynab Starks    Relationship to Patient: Self    Pharmacy: [facility]    Phone Number: [phone]    Reason for Call: PT WANTS TO PROCEED WITH THE KYLEENA LAST 11/24 W/DR. GROSS - MONDAYS ARE HER OFF DAYS, IF THAT HELPS W/SCHEDULING    When was the patient last seen: 11/24

## 2025-04-08 ENCOUNTER — CLINICAL SUPPORT (OUTPATIENT)
Dept: FAMILY MEDICINE CLINIC | Facility: CLINIC | Age: 20
End: 2025-04-08
Payer: COMMERCIAL

## 2025-04-08 DIAGNOSIS — Z30.42 ENCOUNTER FOR SURVEILLANCE OF INJECTABLE CONTRACEPTIVE: Primary | ICD-10-CM

## 2025-04-08 DIAGNOSIS — Z30.42 ENCOUNTER FOR DEPO-PROVERA CONTRACEPTION: Primary | ICD-10-CM

## 2025-04-08 PROCEDURE — 96372 THER/PROPH/DIAG INJ SC/IM: CPT | Performed by: PHYSICIAN ASSISTANT

## 2025-04-08 RX ORDER — MEDROXYPROGESTERONE ACETATE 150 MG/ML
150 INJECTION, SUSPENSION INTRAMUSCULAR ONCE
Status: COMPLETED | OUTPATIENT
Start: 2025-04-08 | End: 2025-04-08

## 2025-04-08 RX ADMIN — MEDROXYPROGESTERONE ACETATE 150 MG: 150 INJECTION, SUSPENSION INTRAMUSCULAR at 16:35

## 2025-04-21 ENCOUNTER — OFFICE VISIT (OUTPATIENT)
Dept: OBSTETRICS AND GYNECOLOGY | Facility: CLINIC | Age: 20
End: 2025-04-21
Payer: COMMERCIAL

## 2025-04-21 VITALS
RESPIRATION RATE: 18 BRPM | DIASTOLIC BLOOD PRESSURE: 84 MMHG | HEIGHT: 66 IN | SYSTOLIC BLOOD PRESSURE: 126 MMHG | WEIGHT: 261 LBS | BODY MASS INDEX: 41.95 KG/M2

## 2025-04-21 DIAGNOSIS — Z30.430 ENCOUNTER FOR IUD INSERTION: Primary | ICD-10-CM

## 2025-04-21 DIAGNOSIS — Z30.431 IUD CHECK UP: ICD-10-CM

## 2025-04-21 PROCEDURE — 58300 INSERT INTRAUTERINE DEVICE: CPT | Performed by: NURSE PRACTITIONER

## 2025-04-21 NOTE — PROGRESS NOTES
"     Gynecologic Procedure Note        Procedure: IUD Insertion     Procedures    Pre procedure indication 1) Desires Kyleena  Post procedure indication 1) Desires Kyleena    ND: Kyleena  [id number]  Lot #: [id number]  Exp Date: 03/2027  BH device    /84   Resp 18   Ht 167.6 cm (65.98\")   Wt 118 kg (261 lb)   LMP  (LMP Unknown)   BMI 42.15 kg/m²       The risks, benefits, and alternatives to Kyleena were explained at length with the patient. All her questions were answered and consents were signed.  Her LMP was unknown due to depo provera and she is still in her coverage window.  Urine Pregnancy Test was Negative.  Patient does not have an allergy to betadine or shellfish.    Time out: immediate members of the procedure team and patient agree to the following: correct patient, correct site, correct procedure to be performed. JAY Mercado      The patient was placed in a dorsal lithotomy position on the examining table in [facility]. A bimanual exam confirmed the uterus was anteverted. A speculum was inserted into the vagina and the cervix was brought into view.  The cervix was prepped with Betadine. The anterior lip of the cervix was then grasped with a single-tooth tenaculum. The endometrial cavity was then sounded to 7 centimeters. The sealed Kyleena package was opened and the IUD was removed in a sterile fashion.    The upper edge of the depth setting the flange was set at the uterine sound measurement. The  was then carefully advanced to the cervical canal into the uterus to the level of the fundus.  The slider was then retracted about 1 cm and deployed the device. The device was then gently advanced to the fundus. The IUD was then released by pulling the slider down all the way. The  was removed carefully from the uterus. The threads were then cut leaving 2-3 cm visible outside of the cervix.  The single-tooth tenaculum was removed from the anterior lip. Good hemostasis was " noted.   All other instruments were removed from the vagina.       The patient tolerated the procedure well with a moderate amount of discomfort.  She was monitored for 15 minutes prior to discharge.      There were no complications.    The patient was counseled about the need to return in 4 weeks for U/S IUD check.     She was counseled about the need to use a backup method of contraception such as condoms until her post insertion exam was performed. The patient verbalized understanding when the IUD will need to be removed/replaced. Written information was given to the patient.  The patient is counseled to contact us if she has any significant or increasing bleeding, pain, fever, chills, or other concerns. She is instructed to see a doctor right away if she believes that she may be pregnant at any time with the IUD in place.    Return in about 4 weeks (around 5/19/2025) for U/S IUD check.      Kathryn Cruz, APRN  04/21/2025

## 2025-05-19 ENCOUNTER — OFFICE VISIT (OUTPATIENT)
Dept: OBSTETRICS AND GYNECOLOGY | Facility: CLINIC | Age: 20
End: 2025-05-19
Payer: COMMERCIAL

## 2025-05-19 VITALS
WEIGHT: 255.8 LBS | DIASTOLIC BLOOD PRESSURE: 80 MMHG | SYSTOLIC BLOOD PRESSURE: 118 MMHG | HEIGHT: 66 IN | BODY MASS INDEX: 41.11 KG/M2

## 2025-05-19 DIAGNOSIS — Z30.431 IUD CHECK UP: Primary | ICD-10-CM

## 2025-05-19 PROCEDURE — 99212 OFFICE O/P EST SF 10 MIN: CPT | Performed by: NURSE PRACTITIONER

## 2025-05-19 NOTE — PROGRESS NOTES
"    Chief Complaint   Patient presents with    IUD check         Subjective   HPI  Zaynab Starks is a 19 y.o. female, , who presents for IUD check follow up.  She had a Kyleena placed on 2025. Since the IUD placement, the patient has not had any unusual complaints. She has not had a period since the IUD was placed.    The additional following portions of the patient's history were reviewed and updated as appropriate: allergies, current medications, past family history, past medical history, past social history, past surgical history, and problem list.    Did the patient have u/s today? Yes.  Findings showed IUD had correct placement.  I have personally evaluated the U/S and agree with the findings. JAY Mercado    Review of Systems   Constitutional: Negative.    Genitourinary: Negative.      All other systems reviewed and are negative.     I have reviewed and agree with the HPI, ROS, and historical information as entered above. Kathryn Cruz, JAY      Objective   /80   Ht 167.6 cm (65.98\")   Wt 116 kg (255 lb 12.8 oz)   LMP  (LMP Unknown)   BMI 41.31 kg/m²     Physical Exam  Constitutional:       Appearance: Normal appearance.   Pulmonary:      Effort: Pulmonary effort is normal.   Neurological:      Mental Status: She is alert.       Assessment & Plan     Assessment     Problem List Items Addressed This Visit          Genitourinary and Reproductive     IUD check up - Primary    Overview   Kyleena IUD placed 25. U/S confirmed correct placement            U/S confirms correct IUD placement today.     Plan       Return in about 6 months (around 2025) for Annual physical.      JAY Mercado  2025     "